# Patient Record
Sex: MALE | Race: WHITE | NOT HISPANIC OR LATINO | URBAN - METROPOLITAN AREA
[De-identification: names, ages, dates, MRNs, and addresses within clinical notes are randomized per-mention and may not be internally consistent; named-entity substitution may affect disease eponyms.]

---

## 2023-05-23 ENCOUNTER — INPATIENT (INPATIENT)
Facility: HOSPITAL | Age: 68
LOS: 1 days | Discharge: HOME | End: 2023-05-25
Attending: INTERNAL MEDICINE | Admitting: INTERNAL MEDICINE
Payer: COMMERCIAL

## 2023-05-23 VITALS
SYSTOLIC BLOOD PRESSURE: 156 MMHG | RESPIRATION RATE: 16 BRPM | HEART RATE: 68 BPM | DIASTOLIC BLOOD PRESSURE: 91 MMHG | OXYGEN SATURATION: 99 % | TEMPERATURE: 98 F

## 2023-05-23 LAB
ALBUMIN SERPL ELPH-MCNC: 4.6 G/DL — SIGNIFICANT CHANGE UP (ref 3.3–5)
ALP SERPL-CCNC: 48 U/L — SIGNIFICANT CHANGE UP (ref 40–120)
ALT FLD-CCNC: 13 U/L — SIGNIFICANT CHANGE UP (ref 4–41)
ANION GAP SERPL CALC-SCNC: 12 MMOL/L — SIGNIFICANT CHANGE UP (ref 7–14)
AST SERPL-CCNC: 22 U/L — SIGNIFICANT CHANGE UP (ref 4–40)
BASE EXCESS BLDV CALC-SCNC: 0.7 MMOL/L — SIGNIFICANT CHANGE UP (ref -2–3)
BASOPHILS # BLD AUTO: 0.04 K/UL — SIGNIFICANT CHANGE UP (ref 0–0.2)
BASOPHILS NFR BLD AUTO: 0.5 % — SIGNIFICANT CHANGE UP (ref 0–2)
BILIRUB SERPL-MCNC: 0.3 MG/DL — SIGNIFICANT CHANGE UP (ref 0.2–1.2)
BLOOD GAS VENOUS COMPREHENSIVE RESULT: SIGNIFICANT CHANGE UP
BUN SERPL-MCNC: 14 MG/DL — SIGNIFICANT CHANGE UP (ref 7–23)
CALCIUM SERPL-MCNC: 9.3 MG/DL — SIGNIFICANT CHANGE UP (ref 8.4–10.5)
CHLORIDE BLDV-SCNC: 105 MMOL/L — SIGNIFICANT CHANGE UP (ref 96–108)
CHLORIDE SERPL-SCNC: 105 MMOL/L — SIGNIFICANT CHANGE UP (ref 98–107)
CO2 BLDV-SCNC: 29.1 MMOL/L — HIGH (ref 22–26)
CO2 SERPL-SCNC: 24 MMOL/L — SIGNIFICANT CHANGE UP (ref 22–31)
CREAT SERPL-MCNC: 0.97 MG/DL — SIGNIFICANT CHANGE UP (ref 0.5–1.3)
EGFR: 85 ML/MIN/1.73M2 — SIGNIFICANT CHANGE UP
EOSINOPHIL # BLD AUTO: 0 K/UL — SIGNIFICANT CHANGE UP (ref 0–0.5)
EOSINOPHIL NFR BLD AUTO: 0 % — SIGNIFICANT CHANGE UP (ref 0–6)
GAS PNL BLDV: 138 MMOL/L — SIGNIFICANT CHANGE UP (ref 136–145)
GLUCOSE BLDV-MCNC: 124 MG/DL — HIGH (ref 70–99)
GLUCOSE SERPL-MCNC: 118 MG/DL — HIGH (ref 70–99)
HCO3 BLDV-SCNC: 28 MMOL/L — SIGNIFICANT CHANGE UP (ref 22–29)
HCT VFR BLD CALC: 46.8 % — SIGNIFICANT CHANGE UP (ref 39–50)
HCT VFR BLDA CALC: 47 % — SIGNIFICANT CHANGE UP (ref 39–51)
HGB BLD CALC-MCNC: 15.5 G/DL — SIGNIFICANT CHANGE UP (ref 12.6–17.4)
HGB BLD-MCNC: 14.7 G/DL — SIGNIFICANT CHANGE UP (ref 13–17)
IANC: 6.11 K/UL — SIGNIFICANT CHANGE UP (ref 1.8–7.4)
IMM GRANULOCYTES NFR BLD AUTO: 0.4 % — SIGNIFICANT CHANGE UP (ref 0–0.9)
LACTATE BLDV-MCNC: 1.7 MMOL/L — SIGNIFICANT CHANGE UP (ref 0.5–2)
LIDOCAIN IGE QN: 28 U/L — SIGNIFICANT CHANGE UP (ref 7–60)
LYMPHOCYTES # BLD AUTO: 1.17 K/UL — SIGNIFICANT CHANGE UP (ref 1–3.3)
LYMPHOCYTES # BLD AUTO: 15.1 % — SIGNIFICANT CHANGE UP (ref 13–44)
MCHC RBC-ENTMCNC: 28.2 PG — SIGNIFICANT CHANGE UP (ref 27–34)
MCHC RBC-ENTMCNC: 31.4 GM/DL — LOW (ref 32–36)
MCV RBC AUTO: 89.7 FL — SIGNIFICANT CHANGE UP (ref 80–100)
MONOCYTES # BLD AUTO: 0.38 K/UL — SIGNIFICANT CHANGE UP (ref 0–0.9)
MONOCYTES NFR BLD AUTO: 4.9 % — SIGNIFICANT CHANGE UP (ref 2–14)
NEUTROPHILS # BLD AUTO: 6.11 K/UL — SIGNIFICANT CHANGE UP (ref 1.8–7.4)
NEUTROPHILS NFR BLD AUTO: 79.1 % — HIGH (ref 43–77)
NRBC # BLD: 0 /100 WBCS — SIGNIFICANT CHANGE UP (ref 0–0)
NRBC # FLD: 0 K/UL — SIGNIFICANT CHANGE UP (ref 0–0)
NT-PROBNP SERPL-SCNC: 113 PG/ML — SIGNIFICANT CHANGE UP
PCO2 BLDV: 51 MMHG — SIGNIFICANT CHANGE UP (ref 42–55)
PH BLDV: 7.34 — SIGNIFICANT CHANGE UP (ref 7.32–7.43)
PLATELET # BLD AUTO: 220 K/UL — SIGNIFICANT CHANGE UP (ref 150–400)
PO2 BLDV: 37 MMHG — SIGNIFICANT CHANGE UP (ref 25–45)
POTASSIUM BLDV-SCNC: 4.4 MMOL/L — SIGNIFICANT CHANGE UP (ref 3.5–5.1)
POTASSIUM SERPL-MCNC: 4.1 MMOL/L — SIGNIFICANT CHANGE UP (ref 3.5–5.3)
POTASSIUM SERPL-SCNC: 4.1 MMOL/L — SIGNIFICANT CHANGE UP (ref 3.5–5.3)
PROT SERPL-MCNC: 7.3 G/DL — SIGNIFICANT CHANGE UP (ref 6–8.3)
RBC # BLD: 5.22 M/UL — SIGNIFICANT CHANGE UP (ref 4.2–5.8)
RBC # FLD: 13.8 % — SIGNIFICANT CHANGE UP (ref 10.3–14.5)
SAO2 % BLDV: 59.1 % — LOW (ref 67–88)
SODIUM SERPL-SCNC: 141 MMOL/L — SIGNIFICANT CHANGE UP (ref 135–145)
TROPONIN T, HIGH SENSITIVITY RESULT: 87 NG/L — CRITICAL HIGH
WBC # BLD: 7.73 K/UL — SIGNIFICANT CHANGE UP (ref 3.8–10.5)
WBC # FLD AUTO: 7.73 K/UL — SIGNIFICANT CHANGE UP (ref 3.8–10.5)

## 2023-05-23 PROCEDURE — 99285 EMERGENCY DEPT VISIT HI MDM: CPT

## 2023-05-23 RX ORDER — HEPARIN SODIUM 5000 [USP'U]/ML
6000 INJECTION INTRAVENOUS; SUBCUTANEOUS EVERY 6 HOURS
Refills: 0 | Status: DISCONTINUED | OUTPATIENT
Start: 2023-05-23 | End: 2023-05-24

## 2023-05-23 RX ORDER — HEPARIN SODIUM 5000 [USP'U]/ML
5000 INJECTION INTRAVENOUS; SUBCUTANEOUS ONCE
Refills: 0 | Status: COMPLETED | OUTPATIENT
Start: 2023-05-23 | End: 2023-05-24

## 2023-05-23 RX ORDER — ASPIRIN/CALCIUM CARB/MAGNESIUM 324 MG
162 TABLET ORAL ONCE
Refills: 0 | Status: COMPLETED | OUTPATIENT
Start: 2023-05-23 | End: 2023-05-23

## 2023-05-23 RX ORDER — HEPARIN SODIUM 5000 [USP'U]/ML
INJECTION INTRAVENOUS; SUBCUTANEOUS
Qty: 25000 | Refills: 0 | Status: DISCONTINUED | OUTPATIENT
Start: 2023-05-23 | End: 2023-05-24

## 2023-05-23 RX ADMIN — Medication 162 MILLIGRAM(S): at 21:58

## 2023-05-23 NOTE — ED ADULT NURSE NOTE - NSFALLUNIVINTERV_ED_ALL_ED
Bed/Stretcher in lowest position, wheels locked, appropriate side rails in place/Call bell, personal items and telephone in reach/Instruct patient to call for assistance before getting out of bed/chair/stretcher/Non-slip footwear applied when patient is off stretcher/[employer] to call system/Physically safe environment - no spills, clutter or unnecessary equipment/Purposeful proactive rounding/Room/bathroom lighting operational, light cord in reach

## 2023-05-23 NOTE — ED ADULT TRIAGE NOTE - CHIEF COMPLAINT QUOTE
Pt c/o intermittent chest pain, radiating across chest accompanied with chills, breathing even and unlabored, denies headache/dizziness, no abd pain, afebrile.

## 2023-05-23 NOTE — ED ADULT NURSE NOTE - OBJECTIVE STATEMENT
Patient received in ED intake room 5. A&Ox4 and ambulatory. C/o midsternal chest pain, SOB and fevers since around 1700 today. Pt states also endorsed 1 episode of vomiting, no active vomiting at this time. Pt states chest pain is intermittent and currently denies at this time. Placed on bedside cardiac monitor - NSR. 100% O2 saturation on room air. Respirations even and unlabored. No acute distress noted. Speaking in full and complete sentences. Appears comfortably laying in stretcher, HOB elevated. Aspirin administered as ordered. IV 20g placed to right AC, labs drawn and sent as ordered. Bed in lowest position, call bell in reach, wheels locked, appropriate side rails up, safety maintained. Awaiting xray.

## 2023-05-23 NOTE — ED PROVIDER NOTE - PROGRESS NOTE DETAILS
received pt as a sign out with concern for ACS. EKG - no stemi. Noted to have elevated trop. WIll start the patient on heparin. D/w hospitalist - accepted admission. Cards is paged, awaiting call back.

## 2023-05-23 NOTE — ED PROVIDER NOTE - CLINICAL SUMMARY MEDICAL DECISION MAKING FREE TEXT BOX
68 yo M hx HLD, presenting with complaints of chest pain that started at 5 pm upon waking up with associated nausea and sob. Took an aspirin at that time which helped and he tried eating a hamburger thinking he was hungry but ended up having episode of vomiting. Symptoms have since subsided. Denies radiating pain, numbness/tingling. No fevers/chills or cough. Last seen by doctor a few years ago. Hypertensive today but denies known hx HTN. On exam, well appearing, NAD, heart rrr, lungs ctab, abd soft nt/nd. EKG without stemi, HEART score moderate, plan for CDU vs admission for cardiac work up.

## 2023-05-24 DIAGNOSIS — Z79.899 OTHER LONG TERM (CURRENT) DRUG THERAPY: ICD-10-CM

## 2023-05-24 DIAGNOSIS — R07.9 CHEST PAIN, UNSPECIFIED: ICD-10-CM

## 2023-05-24 DIAGNOSIS — Z29.9 ENCOUNTER FOR PROPHYLACTIC MEASURES, UNSPECIFIED: ICD-10-CM

## 2023-05-24 DIAGNOSIS — I21.4 NON-ST ELEVATION (NSTEMI) MYOCARDIAL INFARCTION: ICD-10-CM

## 2023-05-24 LAB
A1C WITH ESTIMATED AVERAGE GLUCOSE RESULT: 5.6 % — SIGNIFICANT CHANGE UP (ref 4–5.6)
APTT BLD: 30.4 SEC — SIGNIFICANT CHANGE UP (ref 27–36.3)
APTT BLD: 40 SEC — HIGH (ref 27–36.3)
CHOLEST SERPL-MCNC: 272 MG/DL — HIGH
CK MB BLD-MCNC: 12.9 % — HIGH (ref 0–2.5)
CK MB CFR SERPL CALC: 51.7 NG/ML — HIGH
CK SERPL-CCNC: 402 U/L — HIGH (ref 30–200)
ESTIMATED AVERAGE GLUCOSE: 114 — SIGNIFICANT CHANGE UP
HCT VFR BLD CALC: 46.5 % — SIGNIFICANT CHANGE UP (ref 39–50)
HDLC SERPL-MCNC: 65 MG/DL — SIGNIFICANT CHANGE UP
HGB BLD-MCNC: 15 G/DL — SIGNIFICANT CHANGE UP (ref 13–17)
INR BLD: 1.09 RATIO — SIGNIFICANT CHANGE UP (ref 0.88–1.16)
LIPID PNL WITH DIRECT LDL SERPL: 191 MG/DL — HIGH
MCHC RBC-ENTMCNC: 28.8 PG — SIGNIFICANT CHANGE UP (ref 27–34)
MCHC RBC-ENTMCNC: 32.3 GM/DL — SIGNIFICANT CHANGE UP (ref 32–36)
MCV RBC AUTO: 89.4 FL — SIGNIFICANT CHANGE UP (ref 80–100)
NON HDL CHOLESTEROL: 207 MG/DL — HIGH
NRBC # BLD: 0 /100 WBCS — SIGNIFICANT CHANGE UP (ref 0–0)
NRBC # FLD: 0 K/UL — SIGNIFICANT CHANGE UP (ref 0–0)
PLATELET # BLD AUTO: 223 K/UL — SIGNIFICANT CHANGE UP (ref 150–400)
PROTHROM AB SERPL-ACNC: 12.7 SEC — SIGNIFICANT CHANGE UP (ref 10.5–13.4)
RBC # BLD: 5.2 M/UL — SIGNIFICANT CHANGE UP (ref 4.2–5.8)
RBC # FLD: 14 % — SIGNIFICANT CHANGE UP (ref 10.3–14.5)
TRIGL SERPL-MCNC: 82 MG/DL — SIGNIFICANT CHANGE UP
TROPONIN T, HIGH SENSITIVITY RESULT: 252 NG/L — CRITICAL HIGH
TROPONIN T, HIGH SENSITIVITY RESULT: 374 NG/L — CRITICAL HIGH
WBC # BLD: 8.01 K/UL — SIGNIFICANT CHANGE UP (ref 3.8–10.5)
WBC # FLD AUTO: 8.01 K/UL — SIGNIFICANT CHANGE UP (ref 3.8–10.5)

## 2023-05-24 PROCEDURE — 93010 ELECTROCARDIOGRAM REPORT: CPT

## 2023-05-24 PROCEDURE — 93458 L HRT ARTERY/VENTRICLE ANGIO: CPT | Mod: 26,59

## 2023-05-24 PROCEDURE — 99223 1ST HOSP IP/OBS HIGH 75: CPT

## 2023-05-24 PROCEDURE — 71046 X-RAY EXAM CHEST 2 VIEWS: CPT | Mod: 26

## 2023-05-24 PROCEDURE — 99152 MOD SED SAME PHYS/QHP 5/>YRS: CPT

## 2023-05-24 PROCEDURE — 92941 PRQ TRLML REVSC TOT OCCL AMI: CPT | Mod: LD

## 2023-05-24 RX ORDER — ATORVASTATIN CALCIUM 80 MG/1
80 TABLET, FILM COATED ORAL ONCE
Refills: 0 | Status: DISCONTINUED | OUTPATIENT
Start: 2023-05-24 | End: 2023-05-25

## 2023-05-24 RX ORDER — TICAGRELOR 90 MG/1
1 TABLET ORAL
Qty: 60 | Refills: 0
Start: 2023-05-24 | End: 2023-06-22

## 2023-05-24 RX ORDER — ACETAMINOPHEN 500 MG
650 TABLET ORAL ONCE
Refills: 0 | Status: COMPLETED | OUTPATIENT
Start: 2023-05-24 | End: 2023-05-24

## 2023-05-24 RX ORDER — CARVEDILOL PHOSPHATE 80 MG/1
3.12 CAPSULE, EXTENDED RELEASE ORAL EVERY 12 HOURS
Refills: 0 | Status: DISCONTINUED | OUTPATIENT
Start: 2023-05-24 | End: 2023-05-25

## 2023-05-24 RX ORDER — ASPIRIN/CALCIUM CARB/MAGNESIUM 324 MG
81 TABLET ORAL DAILY
Refills: 0 | Status: DISCONTINUED | OUTPATIENT
Start: 2023-05-25 | End: 2023-05-25

## 2023-05-24 RX ORDER — SODIUM CHLORIDE 9 MG/ML
1000 INJECTION INTRAMUSCULAR; INTRAVENOUS; SUBCUTANEOUS
Refills: 0 | Status: DISCONTINUED | OUTPATIENT
Start: 2023-05-24 | End: 2023-05-25

## 2023-05-24 RX ORDER — TICAGRELOR 90 MG/1
1 TABLET ORAL
Qty: 60 | Refills: 6
Start: 2023-05-24 | End: 2023-12-19

## 2023-05-24 RX ORDER — TICAGRELOR 90 MG/1
90 TABLET ORAL EVERY 12 HOURS
Refills: 0 | Status: DISCONTINUED | OUTPATIENT
Start: 2023-05-24 | End: 2023-05-25

## 2023-05-24 RX ORDER — TICAGRELOR 90 MG/1
180 TABLET ORAL ONCE
Refills: 0 | Status: COMPLETED | OUTPATIENT
Start: 2023-05-24 | End: 2023-05-24

## 2023-05-24 RX ORDER — ATORVASTATIN CALCIUM 80 MG/1
80 TABLET, FILM COATED ORAL AT BEDTIME
Refills: 0 | Status: DISCONTINUED | OUTPATIENT
Start: 2023-05-24 | End: 2023-05-25

## 2023-05-24 RX ADMIN — HEPARIN SODIUM 5000 UNIT(S): 5000 INJECTION INTRAVENOUS; SUBCUTANEOUS at 00:17

## 2023-05-24 RX ADMIN — Medication 650 MILLIGRAM(S): at 19:40

## 2023-05-24 RX ADMIN — Medication 650 MILLIGRAM(S): at 20:40

## 2023-05-24 RX ADMIN — TICAGRELOR 180 MILLIGRAM(S): 90 TABLET ORAL at 01:31

## 2023-05-24 RX ADMIN — HEPARIN SODIUM 1200 UNIT(S)/HR: 5000 INJECTION INTRAVENOUS; SUBCUTANEOUS at 08:30

## 2023-05-24 RX ADMIN — HEPARIN SODIUM 1000 UNIT(S)/HR: 5000 INJECTION INTRAVENOUS; SUBCUTANEOUS at 00:18

## 2023-05-24 RX ADMIN — SODIUM CHLORIDE 200 MILLILITER(S): 9 INJECTION INTRAMUSCULAR; INTRAVENOUS; SUBCUTANEOUS at 12:45

## 2023-05-24 RX ADMIN — TICAGRELOR 90 MILLIGRAM(S): 90 TABLET ORAL at 14:02

## 2023-05-24 RX ADMIN — HEPARIN SODIUM 1200 UNIT(S)/HR: 5000 INJECTION INTRAVENOUS; SUBCUTANEOUS at 07:20

## 2023-05-24 RX ADMIN — CARVEDILOL PHOSPHATE 3.12 MILLIGRAM(S): 80 CAPSULE, EXTENDED RELEASE ORAL at 19:40

## 2023-05-24 RX ADMIN — ATORVASTATIN CALCIUM 80 MILLIGRAM(S): 80 TABLET, FILM COATED ORAL at 21:26

## 2023-05-24 NOTE — PROVIDER CONTACT NOTE (MEDICATION) - RECOMMENDATIONS
Brilinta will cost patient $30 a month as per [facility] staff. They can give a 1 month free trial and then can give a coupon for 5 dollars thereafter. Patient will  prescription from [facility] upon discharge and then 5 dollar coupon given to patient and 6 month supply sent to patient's primary pharmacy

## 2023-05-24 NOTE — PATIENT PROFILE ADULT - FALL HARM RISK - HARM RISK INTERVENTIONS
Communicate Risk of Fall with Harm to all staff/Orthostatic vital signs/Reinforce activity limits and safety measures with patient and family/Tailored Fall Risk Interventions/Visual Cue: Yellow wristband and red socks/Bed in lowest position, wheels locked, appropriate side rails in place/Call bell, personal items and telephone in reach/Instruct patient to call for assistance before getting out of bed or chair/Non-slip footwear when patient is out of bed/[employer] to call system/Physically safe environment - no spills, clutter or unnecessary equipment/Purposeful Proactive Rounding/Room/bathroom lighting operational, light cord in reach

## 2023-05-24 NOTE — H&P ADULT - PROBLEM SELECTOR PLAN 1
-although  jodie NSTEM score not markedly elevated, presentation with recent h/o reduced exercise tolerance and trop 87, renders it reasonable to initiate dapt/ heparin gtt  -tele, tte, npo pending cards eval  -lipid panel, a1c ordered

## 2023-05-24 NOTE — CONSULT NOTE ADULT - ASSESSMENT
66 yo M hx HLD, presenting with complaints of chest pain that started at 5 pm upon waking up with associated nausea and sob.     # NSTEMI  - Patient with elevated CE and 6/10 chest pain. Chest pain free at present.   - Admit to telemetry  - Load with  mg PO x 1 now, Brilinta 180 mg PO x 1 and start heparin gtt as per ACS protocol  - Assess for resolution of chest pain and recurrence in chest pain. Serial EKG PRN chest pain to assess for ST changes  - Continuous cardiac monitoring to monitor for arrhythmias  - CBC, CMP, coags, HbA1C, TSH, lipids for comorbidities, Trend cardiac enzymes  - Aspirin 81 PO daily, Brilinta 90 mg PO BID, Lipitor 80 mg PO daily  - If persistent chest pain with EKG changes, will plan for emergent cath   - Hold ACE for now in anticipation of possible cath.   - Low fat DASH diet  - ECHO: TTE in am      Thank you, if any questions or clinical situation changes please call [facility] #[phone].  Attending Attestation to follow     68 yo M hx HLD, presenting with complaints of chest pain that started at 5 pm upon waking up with associated nausea and sob.     # NSTEMI  - Patient with elevated CE and 6/10 chest pain. Chest pain free at present.   - Admit to telemetry  - Load with  mg PO x 1 now, Brilinta 180 mg PO x 1 and start heparin gtt as per ACS protocol  - Assess for resolution of chest pain and recurrence in chest pain. Serial EKG PRN chest pain to assess for ST changes  - Continuous cardiac monitoring to monitor for arrhythmias  - CBC, CMP, coags, HbA1C, TSH, lipids for comorbidities, Trend cardiac enzymes  - Aspirin 81 PO daily, Brilinta 90 mg PO BID, Lipitor 80 mg PO daily  - If persistent chest pain with EKG changes, will plan for emergent cath   - Hold ACE for now in anticipation of possible cath.   - Low fat DASH diet  - ECHO: TTE in am      Thank you, if any questions or clinical situation changes please call [facility] #[phone].  Attending Attestation to follow    This patient was seen and examined personally by me and the plan was discussed with the fellow and/or resident above. Amendments were made as necessary to the above. Agree with the excellent note and plan above. 67M w HL here w CP. Trop and TTE pending; ACS tx.    Jr Squires MD, MPhil, [facility]  Cardiologist, [facility]  ; [employer] and [facility] [place]  Email: [email or web address] Cardiology and Cardiovascular Surgery on-service contact/call information, go to [email or web address] and use "[email or web address]" to login.  Outpatient Cardiology appointments, call [phone] to arrange with a colleague; I do not have outpatient Cardiology clinic.

## 2023-05-24 NOTE — H&P ADULT - HISTORY OF PRESENT ILLNESS
66 yo m with h/o HLD on statin but reports suboptimal compliance  presenting with left sided chest pain radiating across his chest which spontaneously resolved within 2 hours. Denies cough, fever, leg swelling. Reports decreased exercise tolerance in last 2 weeks. Denies prior cath but underwent negative stress test 2 years ago. EKG nonischemic Trop 87

## 2023-05-24 NOTE — H&P ADULT - NSHPLABSRESULTS_GEN_ALL_CORE
14.7   7.73  )-----------( 220      ( 23 May 2023 22:00 )             46.8     05-23    141  |  105  |  14  ----------------------------<  118<H>  4.1   |  24  |  0.97    Ca    9.3      23 May 2023 22:00    TPro  7.3  /  Alb  4.6  /  TBili  0.3  /  DBili  x   /  AST  22  /  ALT  13  /  AlkPhos  48  05-23    CAPILLARY BLOOD GLUCOSE        PT/INR - ( 23 May 2023 23:43 )   PT: 12.7 sec;   INR: 1.09 ratio         PTT - ( 23 May 2023 23:43 )  PTT:30.4 sec        Vital Signs Last 24 Hrs  T(C): 36.4 (24 May 2023 00:52), Max: 37 (23 May 2023 23:46)  T(F): 97.6 (24 May 2023 00:52), Max: 98.6 (23 May 2023 23:46)  HR: 64 (24 May 2023 00:52) (64 - 68)  BP: 122/77 (24 May 2023 00:52) (122/77 - 156/91)  BP(mean): --  RR: 16 (24 May 2023 00:52) (16 - 17)  SpO2: 99% (24 May 2023 00:52) (99% - 99%)    Parameters below as of 24 May 2023 00:52  Patient On (Oxygen Delivery Method): room air

## 2023-05-24 NOTE — CHART NOTE - NSCHARTNOTEFT_GEN_A_CORE
Right radial site s/p LHC procedure is without bleeding or hematoma. Dressing dry, clean and intact. Vital signs stable. BL radial and brachial pulses palpable and symmetric. Sensation, strength, and ROM equal bilaterally. Patient denies chest pain, SOB, numbness, and tingling. Will continue to monitor.
NSTEMI patient with significant LAD disease s/p PCI with GRAHAM.     Please start Coreg 3.125mg BID  - Continue ASA, Brillinta and lipitor      Mariano Marrero MD  Cardiology Fellow- PGY 4

## 2023-05-24 NOTE — CONSULT NOTE ADULT - SUBJECTIVE AND OBJECTIVE BOX
HPI:  68 yo m with h/o HLD on statin but reports suboptimal compliance  presenting with left sided chest pain radiating across his chest which spontaneously resolved within 2 hours. Denies cough, fever, leg swelling. Reports decreased exercise tolerance in last 2 weeks. Denies prior cath but underwent negative stress test 2 years ago. EKG nonischemic Trop 87 (24 May 2023 01:20)  	     Allergies    No Known Allergies    Intolerances    	  MEDICATIONS:  heparin   Injectable 6000 Unit(s) IV Push every 6 hours PRN  heparin  Infusion.  Unit(s)/Hr IV Continuous <Continuous>  atorvastatin 80 milliGRAM(s) Oral once  atorvastatin 80 milliGRAM(s) Oral at bedtime        PAST MEDICAL & SURGICAL HISTORY:  HLD (hyperlipidemia)          FAMILY HISTORY:      SUBSTANCE USE  Tobacco Usage:  denies  Alcohol Usage: denies  Recreational drugs: denies      REVIEW OF SYSTEMS:  CV: chest pain (-), palpitation (-), orthopnea (-), PND (-), edema (-)  PULM: SOB (-), cough (-), wheezing (-), hemoptysis (-).   CONST: fever (-), chills (-) or fatigue (-)  GI: abdominal distension (-), abdominal pain (-) , nausea/vomiting (-), hematemesis, (-), melena (-), hematochezia (-)  : dysuria (-), frequency (-), hematuria (-).   NEURO: numbness (-), weakness (-), dizziness (-)  SKIN: itching (-), rash (-)  HEENT:  visual changes (-); vertigo or throat pain (-);  neck stiffness (-)   All other review of systems is negative unless indicated above.      T(C): 36.6 (05-24-23 @ 07:22), Max: 37 (05-23-23 @ 23:46)  HR: 75 (05-24-23 @ 07:22) (64 - 75)  BP: 136/68 (05-24-23 @ 07:22) (122/77 - 156/91)  RR: 18 (05-24-23 @ 07:22) (16 - 18)  SpO2: 100% (05-24-23 @ 07:22) (99% - 100%)  Wt(kg): --  I&O's Summary      Physical Exam:  General: NAD  Cardiovascular: Normal S1 S2, No JVD, No murmurs, No edema  Respiratory: Lungs clear to auscultation	  Gastrointestinal:  Soft, Non-tender, + BS	  Skin: warm and dry, No rashes, No ecchymoses, No cyanosis	  Extremities:  No clubbing, cyanosis or edema  Vascular: Peripheral pulses palpable 2+ bilaterally    CBC Full  -  ( 24 May 2023 05:45 )  WBC Count : 8.01 K/uL  Hemoglobin : 15.0 g/dL  Hematocrit : 46.5 %  Platelet Count - Automated : 223 K/uL  Mean Cell Volume : 89.4 fL  Mean Cell Hemoglobin : 28.8 pg  Mean Cell Hemoglobin Concentration : 32.3 gm/dL  Auto Neutrophil # : x  Auto Lymphocyte # : x  Auto Monocyte # : x  Auto Eosinophil # : x  Auto Basophil # : x  Auto Neutrophil % : x  Auto Lymphocyte % : x  Auto Monocyte % : x  Auto Eosinophil % : x  Auto Basophil % : x    05-23    141  |  105  |  14  ----------------------------<  118<H>  4.1   |  24  |  0.97    Ca    9.3      23 May 2023 22:00    TPro  7.3  /  Alb  4.6  /  TBili  0.3  /  DBili  x   /  AST  22  /  ALT  13  /  AlkPhos  48  05-23    proBNP: 113  CARDIAC MARKERS ( 24 May 2023 05:45 )  374 ng/L / x     / x     / x     / x     / x      CARDIAC MARKERS ( 24 May 2023 02:15 )  252 ng/L / x     / x     / 402 U/L / x     / 51.7 ng/mL  CARDIAC MARKERS ( 23 May 2023 22:00 )  87 ng/L / x     / x     / x     / x     / x              Diagnostic testing:  cath: --  echo: --

## 2023-05-24 NOTE — ED ADULT NURSE REASSESSMENT NOTE - NS ED NURSE REASSESS COMMENT FT1
Heparin infusion started as ordered, running at 10 mL/hr. Pt made aware to let staff know if experiences adverse effects or bleeding. Cosigned with ELIZA [employer]. Pt offers no complaints at this time. Appears comfortable in stretcher. HOB elevated. NSR on bedside cardiac monitor. Respirations even and unlabored. Awaiting bed assignment as per admission orders.
pt a&ox4. 2nd access obtained 20g to the . labs collected and sent. pt weighed. md made aware, pending repeat troponin at 01:00AM. pt normal sinus on monitor. pt respirations even and unlabored with no accessory muscle use.
[facility].

## 2023-05-24 NOTE — H&P ADULT - NSHPREVIEWOFSYSTEMS_GEN_ALL_CORE
Review of Systems:   CONSTITUTIONAL: No fever  EYES: No eye pain, visual disturbances, or discharge  ENMT:  No difficulty hearing, tinnitus, vertigo; No sinus or throat pain  NECK: No pain or stiffness  RESPIRATORY: No cough, wheezing, chills or hemoptysis; No shortness of breath  CARDIOVASCULAR: resolved chest pain, no palpitations, dizziness, or leg swelling  GASTROINTESTINAL: No abdominal or epigastric pain. No nausea, vomiting, or hematemesis; No diarrhea or constipation. No melena or hematochezia.  GENITOURINARY: No dysuria, frequency, hematuria, or incontinence  NEUROLOGICAL: No headaches, memory loss, loss of strength, numbness, or tremors  SKIN: No itching, burning, rashes, or lesions   LYMPH NODES: No enlarged glands  ENDOCRINE: No heat or cold intolerance; No hair loss  MUSCULOSKELETAL: No joint pain or swelling; No muscle, back, or extremity pain

## 2023-05-24 NOTE — PROVIDER CONTACT NOTE (MEDICATION) - BACKGROUND
Patient s/p C with stents placed x 2 to the prox and mid LAD and Brilinta script sent to [facility] for copay check.

## 2023-05-25 VITALS
RESPIRATION RATE: 18 BRPM | SYSTOLIC BLOOD PRESSURE: 139 MMHG | DIASTOLIC BLOOD PRESSURE: 86 MMHG | OXYGEN SATURATION: 99 % | TEMPERATURE: 98 F | HEART RATE: 69 BPM

## 2023-05-25 LAB
ANION GAP SERPL CALC-SCNC: 10 MMOL/L — SIGNIFICANT CHANGE UP (ref 7–14)
BUN SERPL-MCNC: 12 MG/DL — SIGNIFICANT CHANGE UP (ref 7–23)
CALCIUM SERPL-MCNC: 8.6 MG/DL — SIGNIFICANT CHANGE UP (ref 8.4–10.5)
CHLORIDE SERPL-SCNC: 106 MMOL/L — SIGNIFICANT CHANGE UP (ref 98–107)
CO2 SERPL-SCNC: 22 MMOL/L — SIGNIFICANT CHANGE UP (ref 22–31)
CREAT SERPL-MCNC: 0.91 MG/DL — SIGNIFICANT CHANGE UP (ref 0.5–1.3)
EGFR: 92 ML/MIN/1.73M2 — SIGNIFICANT CHANGE UP
GLUCOSE SERPL-MCNC: 107 MG/DL — HIGH (ref 70–99)
HCT VFR BLD CALC: 43.1 % — SIGNIFICANT CHANGE UP (ref 39–50)
HCV AB S/CO SERPL IA: 0.08 S/CO — SIGNIFICANT CHANGE UP (ref 0–0.99)
HCV AB SERPL-IMP: SIGNIFICANT CHANGE UP
HGB BLD-MCNC: 14.2 G/DL — SIGNIFICANT CHANGE UP (ref 13–17)
MAGNESIUM SERPL-MCNC: 2.2 MG/DL — SIGNIFICANT CHANGE UP (ref 1.6–2.6)
MCHC RBC-ENTMCNC: 29.6 PG — SIGNIFICANT CHANGE UP (ref 27–34)
MCHC RBC-ENTMCNC: 32.9 GM/DL — SIGNIFICANT CHANGE UP (ref 32–36)
MCV RBC AUTO: 89.8 FL — SIGNIFICANT CHANGE UP (ref 80–100)
NRBC # BLD: 0 /100 WBCS — SIGNIFICANT CHANGE UP (ref 0–0)
NRBC # FLD: 0 K/UL — SIGNIFICANT CHANGE UP (ref 0–0)
PHOSPHATE SERPL-MCNC: 2.4 MG/DL — LOW (ref 2.5–4.5)
PLATELET # BLD AUTO: 199 K/UL — SIGNIFICANT CHANGE UP (ref 150–400)
POTASSIUM SERPL-MCNC: 3.6 MMOL/L — SIGNIFICANT CHANGE UP (ref 3.5–5.3)
POTASSIUM SERPL-SCNC: 3.6 MMOL/L — SIGNIFICANT CHANGE UP (ref 3.5–5.3)
RBC # BLD: 4.8 M/UL — SIGNIFICANT CHANGE UP (ref 4.2–5.8)
RBC # FLD: 14.1 % — SIGNIFICANT CHANGE UP (ref 10.3–14.5)
SODIUM SERPL-SCNC: 138 MMOL/L — SIGNIFICANT CHANGE UP (ref 135–145)
WBC # BLD: 7.17 K/UL — SIGNIFICANT CHANGE UP (ref 3.8–10.5)
WBC # FLD AUTO: 7.17 K/UL — SIGNIFICANT CHANGE UP (ref 3.8–10.5)

## 2023-05-25 PROCEDURE — 93306 TTE W/DOPPLER COMPLETE: CPT | Mod: 26

## 2023-05-25 PROCEDURE — 99233 SBSQ HOSP IP/OBS HIGH 50: CPT

## 2023-05-25 RX ORDER — ASPIRIN/CALCIUM CARB/MAGNESIUM 324 MG
1 TABLET ORAL
Qty: 0 | Refills: 0 | DISCHARGE
Start: 2023-05-25

## 2023-05-25 RX ORDER — ATORVASTATIN CALCIUM 80 MG/1
1 TABLET, FILM COATED ORAL
Refills: 0 | DISCHARGE

## 2023-05-25 RX ORDER — CARVEDILOL PHOSPHATE 80 MG/1
1 CAPSULE, EXTENDED RELEASE ORAL
Qty: 60 | Refills: 0
Start: 2023-05-25 | End: 2023-06-23

## 2023-05-25 RX ORDER — ATORVASTATIN CALCIUM 80 MG/1
1 TABLET, FILM COATED ORAL
Qty: 30 | Refills: 0
Start: 2023-05-25 | End: 2023-06-23

## 2023-05-25 RX ADMIN — Medication 81 MILLIGRAM(S): at 14:47

## 2023-05-25 RX ADMIN — TICAGRELOR 90 MILLIGRAM(S): 90 TABLET ORAL at 14:48

## 2023-05-25 RX ADMIN — TICAGRELOR 90 MILLIGRAM(S): 90 TABLET ORAL at 02:10

## 2023-05-25 RX ADMIN — CARVEDILOL PHOSPHATE 3.12 MILLIGRAM(S): 80 CAPSULE, EXTENDED RELEASE ORAL at 05:22

## 2023-05-25 NOTE — DISCHARGE NOTE PROVIDER - NSDCMRMEDTOKEN_GEN_ALL_CORE_FT
aspirin 81 mg oral delayed release tablet: 1 tab(s) orally once a day  atorvastatin 80 mg oral tablet: 1 tab(s) orally once a day (at bedtime)  Brilinta (ticagrelor) 90 mg oral tablet: 1 tab(s) orally every 12 hours  carvedilol 3.125 mg oral tablet: 1 tab(s) orally every 12 hours

## 2023-05-25 NOTE — PROGRESS NOTE ADULT - SUBJECTIVE AND OBJECTIVE BOX
pt was evaluated by hospitalist earlier during the day   cards f/u note reviewed  discussed management plan with acp covering pt
JEWELS MCGRAW  PGY-1  Cardiology Consult Service  All Cardiology service information can be found 24/7 on [email or web address], password: [email or web address]    Patient seen and examined at bedside. Comfortable, no acute complaints.     Overnight Events: NAEO.     Review Of Systems: No chest pain, shortness of breath, or palpitations            Current Meds:  aspirin enteric coated 81 milliGRAM(s) Oral daily  atorvastatin 80 milliGRAM(s) Oral once  atorvastatin 80 milliGRAM(s) Oral at bedtime  carvedilol 3.125 milliGRAM(s) Oral every 12 hours  sodium chloride 0.9%. 1000 milliLiter(s) IV Continuous <Continuous>  ticagrelor 90 milliGRAM(s) Oral every 12 hours      Vitals:  T(F): 97.8 (05-25), Max: 98 (05-24)  HR: 65 (05-25) (64 - 65)  BP: 111/68 (05-25) (111/68 - 130/78)  RR: 18 (05-25)  SpO2: 99% (05-25)  I&O's Summary      Physical Exam:  Appearance: No acute distress; well appearing  Eyes: PERRL, EOMI, pink conjunctiva  HEENT: Normal oral mucosa  Cardiovascular: RRR, S1, S2, no murmurs, rubs, or gallops; no edema; no JVD  Respiratory: Clear to auscultation bilaterally  Gastrointestinal: soft, non-tender, non-distended with normal bowel sounds  Musculoskeletal: No clubbing; no joint deformity   Neurologic: Non-focal  Lymphatic: No lymphadenopathy  Psychiatry: AAOx3, mood & affect appropriate  Skin: No rashes, ecchymoses, or cyanosis                          14.2   7.17  )-----------( 199      ( 25 May 2023 05:52 )             43.1     05-25    138  |  106  |  12  ----------------------------<  107<H>  3.6   |  22  |  0.91    Ca    8.6      25 May 2023 05:52  Phos  2.4     05-25  Mg     2.20     05-25    TPro  7.3  /  Alb  4.6  /  TBili  0.3  /  DBili  x   /  AST  22  /  ALT  13  /  AlkPhos  48  05-23    PT/INR - ( 23 May 2023 23:43 )   PT: 12.7 sec;   INR: 1.09 ratio         PTT - ( 24 May 2023 05:55 )  PTT:40.0 sec  CARDIAC MARKERS ( 24 May 2023 05:45 )  374 ng/L / x     / x     / x     / x     / x      CARDIAC MARKERS ( 24 May 2023 02:15 )  252 ng/L / x     / x     / 402 U/L / x     / 51.7 ng/mL  CARDIAC MARKERS ( 23 May 2023 22:00 )  87 ng/L / x     / x     / x     / x     / x                  New ECG(s): Personally reviewed    Echo:    Stress Testing:     Cath: 5/24 LHC w/ 90% LAD Stenosis s/p GRAHAM    Imaging:    Interpretation of Telemetry:

## 2023-05-25 NOTE — PHARMACOTHERAPY INTERVENTION NOTE - COMMENTS
Discharge medications reviewed with the patient. Outpatient medication schedule was discussed in detail including: medication name, indication, dose, administration times, treatment duration, side effects, drug interactions, and special instructions. Patient questions and concerns were answered and addressed. Patient demonstrated understanding. Informed patient that medication list may change prior to discharge. Patient provided with educational handout.    Lion Shen PharmD  Clinical Pharmacy Specialist  [phone]

## 2023-05-25 NOTE — DISCHARGE NOTE NURSING/CASE MANAGEMENT/SOCIAL WORK - PATIENT PORTAL LINK FT
You can access the FollowMyHealth Patient Portal offered by [facility] by registering at the following website: [email or web address]. By joining [employer]’s FollowMyHealth portal, you will also be able to view your health information using other applications (apps) compatible with our system.

## 2023-05-25 NOTE — DISCHARGE NOTE NURSING/CASE MANAGEMENT/SOCIAL WORK - NSDCPEFALRISK_GEN_ALL_CORE
For information on Fall & Injury Prevention, visit: [email or web address] OR  [email or web address] OR  https://www.cdc.gov/steadi/patient.html

## 2023-05-25 NOTE — DISCHARGE NOTE PROVIDER - CARE PROVIDER_API CALL
Keo Hernadez Eden  Internal Medicine  [address] [place] [address]  Phone: [phone]  Fax: [phone]  Follow Up Time: 1 week    Cardiology follow up 1 week,   Phone: (   )    -  Fax: (   )    -  Follow Up Time:

## 2023-05-25 NOTE — DISCHARGE NOTE PROVIDER - PROVIDER TOKENS
PROVIDER:[TOKEN:[[id number]],FOLLOWUP:[1 week]],FREE:[LAST:[Cardiology follow up 1 week],PHONE:[(   )    -],FAX:[(   )    -]]

## 2023-05-25 NOTE — DISCHARGE NOTE PROVIDER - HOSPITAL COURSE
68 yo m with h/o HLD on statin but reports suboptimal compliance  presenting with left sided chest pain radiating across his chest which spontaneously resolved within 2 hours. Denies cough, fever, leg swelling. Reports decreased exercise tolerance in last 2 weeks. Denies prior cath but underwent negative stress test 2 years ago. EKG nonischemic Trop 87 .  Patient underwent cardiac catherization while in the hospital with a stent placement at the LAD. Started on Bralinta.    On 5/25/2023 this case was reviewed with Dr. Bailey, the patient is medically stable and optimized for discharge. All medications were reviewed and prescriptions were sent to mutually agreed upon pharmacy.

## 2023-05-25 NOTE — DISCHARGE NOTE PROVIDER - NSDCCPCAREPLAN_GEN_ALL_CORE_FT
PRINCIPAL DISCHARGE DIAGNOSIS  Diagnosis: Chest pain  Assessment and Plan of Treatment: You were admitted to the hospital for chest pain.  You underwent a cardiac catherization. You had a stent placed.  Please continue your statin medication to control cholesterol levels, as well as, Aspirin ,Bralinta , and coreg as prescribed in order to optimize your heart health.   Follow-up with your primary provider/cardiologist as outpatient for ongoing care. If you have persistent chest pain, shortness of breath, heart palpitations, or dizziness you should return to the emergency room.

## 2023-05-25 NOTE — PROGRESS NOTE ADULT - ASSESSMENT
66 yo M hx HLD, presenting with complaints of chest pain that started at 5 pm upon waking up with associated nausea and sob.     # NSTEMI  -Patient presented w/ chest pain, elevated troponin, ECG nonischemic. Loaded w/ aspirin, brilinta, started on heparin. ACE held. Underwent LHC on 5/24, LAD 90% stenosis, w/ GRAHAM to LAD. Heparin stopped post-cath, trialed on Carvedilol 3.125 BID w/ HRs 50-70's, tolerating.   -Currently on ASA/Brilinta, Atorvastatin, Carvedilol 3.125 BID.   -a1c 5.6,  on last set of labs 5/24  -Echo completed 5/25 AM    Recommendations  -Continue ASA/Brilinta, Atorvastatin, Carvedilol at 3.125.   -Uptitrate ACEi as BP tolerates  -Please ensure w/ pharmacy that pt able to afford Brilinta before discharge  -Follow-up TTE results  -No need to continue trending trops post-cath  -Cardiology follow-up on outpatient   66 yo M hx HLD, presenting with complaints of chest pain that started at 5 pm upon waking up with associated nausea and sob.     # NSTEMI  -Patient presented w/ chest pain, elevated troponin, ECG nonischemic. Loaded w/ aspirin, brilinta, started on heparin. Held off on ACE pending procedure. Underwent LHC on 5/24, LAD 90% stenosis, w/ GRAHAM to LAD. Heparin stopped post-cath, trialed on Carvedilol 3.125 BID w/ HRs 50-70's, tolerating.   -Currently on ASA/Brilinta, Atorvastatin, Carvedilol 3.125 BID.   -a1c 5.6,  on last set of labs 5/24  -Echo completed 5/25 AM    Recommendations  -Continue ASA/Brilinta, Atorvastatin, Carvedilol at 3.125.   -Uptitrate ACEi as BP tolerates  -Please ensure w/ pharmacy that pt able to afford Brilinta before discharge  -Follow-up TTE results  -No need to continue trending trops post-cath  -Cardiology follow-up on outpatient   66 yo M hx HLD, presenting with complaints of chest pain that started at 5 pm upon waking up with associated nausea and sob.     # NSTEMI  -Patient presented w/ chest pain, elevated troponin, ECG nonischemic. Loaded w/ aspirin, brilinta, started on heparin. Held off on ACE pending procedure. Underwent LHC on 5/24, LAD 90% stenosis, w/ GRAHAM to LAD. Heparin stopped post-cath, trialed on Carvedilol 3.125 BID w/ HRs 50-70's, tolerating.   -Currently on ASA/Brilinta, Atorvastatin, Carvedilol 3.125 BID.   -a1c 5.6,  on last set of labs 5/24  -Echo completed 5/25 AM    Recommendations  -Continue ASA/Brilinta, Atorvastatin, Carvedilol at 3.125.   -Uptitrate ACEi as BP tolerates  -Please ensure w/ pharmacy that pt able to afford Brilinta before discharge  -Follow-up TTE results  -No need to continue trending trops post-cath  -Cardiology follow-up on outpatient    This patient was seen and examined personally by me and the plan was discussed with the fellow and/or resident above. Amendments were made as necessary to the above. Agree with the excellent note and plan above. Med mgmt as above.    Jr Squires MD, MPhil, [facility]  Cardiologist, [facility]  ; [facility]  Email: [email or web address] Cardiology and Cardiovascular Surgery on-service contact/call information, go to [email or web address] and use "[email or web address]" to login.  Outpatient Cardiology appointments, call [phone] to arrange with a colleague; I do not have outpatient Cardiology clinic.

## 2024-02-17 ENCOUNTER — EMERGENCY (EMERGENCY)
Facility: HOSPITAL | Age: 69
LOS: 1 days | Discharge: HOME | End: 2024-02-17
Attending: STUDENT IN AN ORGANIZED HEALTH CARE EDUCATION/TRAINING PROGRAM | Admitting: EMERGENCY MEDICINE
Payer: COMMERCIAL

## 2024-02-17 VITALS
SYSTOLIC BLOOD PRESSURE: 138 MMHG | OXYGEN SATURATION: 98 % | TEMPERATURE: 98 F | DIASTOLIC BLOOD PRESSURE: 64 MMHG | RESPIRATION RATE: 18 BRPM | HEART RATE: 75 BPM

## 2024-02-17 VITALS
TEMPERATURE: 98 F | OXYGEN SATURATION: 98 % | SYSTOLIC BLOOD PRESSURE: 159 MMHG | HEART RATE: 74 BPM | DIASTOLIC BLOOD PRESSURE: 104 MMHG | RESPIRATION RATE: 20 BRPM

## 2024-02-17 PROBLEM — E78.5 HYPERLIPIDEMIA, UNSPECIFIED: Chronic | Status: ACTIVE | Noted: 2023-05-23

## 2024-02-17 PROCEDURE — 93010 ELECTROCARDIOGRAM REPORT: CPT

## 2024-02-17 PROCEDURE — 71250 CT THORAX DX C-: CPT | Mod: 26,MA

## 2024-02-17 PROCEDURE — 70450 CT HEAD/BRAIN W/O DYE: CPT | Mod: 26,MA

## 2024-02-17 PROCEDURE — 99285 EMERGENCY DEPT VISIT HI MDM: CPT

## 2024-02-17 PROCEDURE — 72125 CT NECK SPINE W/O DYE: CPT | Mod: 26,MA

## 2024-02-17 RX ORDER — LIDOCAINE 4 G/100G
2 CREAM TOPICAL ONCE
Refills: 0 | Status: COMPLETED | OUTPATIENT
Start: 2024-02-17 | End: 2024-02-17

## 2024-02-17 RX ORDER — KETOROLAC TROMETHAMINE 30 MG/ML
30 SYRINGE (ML) INJECTION ONCE
Refills: 0 | Status: DISCONTINUED | OUTPATIENT
Start: 2024-02-17 | End: 2024-02-17

## 2024-02-17 NOTE — ED PROVIDER NOTE - PATIENT PORTAL LINK FT
You can access the FollowMyHealth Patient Portal offered by [facility] by registering at the following website: [email or web address]. By joining [employer]’s FollowMyHealth portal, you will also be able to view your health information using other applications (apps) compatible with our system.

## 2024-02-17 NOTE — ED PROVIDER NOTE - ATTENDING CONTRIBUTION TO CARE
I have personally performed a face to face medical and diagnostic evaluation of the patient. I have discussed with and reviewed the Resident's note and agree with the History, ROS, Physical Exam and MDM unless otherwise indicated. A brief summary of my personal evaluation and impression can be found below.    Jose Juan MCCARTHY: 68-year-old male history of CAD with stents on Brilinta hypertension hyperlipidemia, presents with a chief complaint of left-sided chest pain after a fall sustained 1 day ago, patient reports he was walking [place] the stairs [place] [employer], fell striking the left side of his chest, no loss of conscious but he did strike the back of his head has been ambulatory since however woke up today and had increasing pain with no nausea vomiting fever no shortness of breath no dyspnea no reports he is having difficulty getting out of bed secondary to his discomfort. Denies changes in vision. Denies numbness, tingling or loss of sensation. Denies loss of motor function. No bowel or bladder retention or incontinence. No midline back pain.     All other ROS negative, except as above and as per HPI and ROS section.    VITALS: Initial triage and subsequent vitals have been reviewed by me.  GEN APPEARANCE: Alert, non-toxic, well-appearing, NAD.  HEAD: Atraumatic.  EYES: PERRLa, EOMI, vision grossly intact.   NECK: Supple  CV: RRR, S1S2, no c/r/m/g. Cap refill <2 seconds. No bruits.   LUNGS: CTAB. No abnormal breath sounds.  ABDOMEN: Soft, NTND. No guarding or rebound.   MSK/EXT: mild diffuse L chest wall tenderness, No spinal or extremity point tenderness. No CVA ttp. Pelvis stable. No peripheral edema.  NEURO: Alert, follows commands. Weight bearing normal. Speech normal. Sensation and motor normal x4 extremities.   SKIN: abrasion to back of scalp, Warm, dry and intact. No rash.  PSYCH: Appropriate    Plan/MDM: exam vss non toxic PE as above ddx c/f msk sprain / strain vs possible rib fractures, presentation does not appear c/w that of acs, no c spine tenderness, no abdominal tenderness therefore have low suspicion for intraabdominal surgical pathology, will get screening ekg, check cth/ ct chest, will give meds as needed, reassess, dispo pending.

## 2024-02-17 NOTE — ED ADULT NURSE NOTE - OBJECTIVE STATEMENT
Received patient in room 10 s/p fall w/head injury and abrasion to back of the head. Patient denies SOB, chest pain, fever, dizziness. Patient is A&OX4, airway patent, breathing unlabored and even, radial pulses palpable, lungs clear, abdomen soft, nontender. Medications given as ordered, awaiting CT scan. Side rails up and safety maintained. Fall precaution in place. Call bells within reach. Received patient in room 10 s/p fall w/head injury and abrasion to back of the head. Patient reports left sided chest pain. Patient denies SOB, fever. Patient is A&OX4, airway patent, breathing unlabored and even, radial pulses palpable, lungs clear, abdomen soft, nontender. Awaiting CT scan. Side rails up and safety maintained. Fall precaution in place. Call bells within reach.

## 2024-02-17 NOTE — ED PROVIDER NOTE - PHYSICAL EXAMINATION
Constitutional: VS reviewed. Alert and orientedx3, well appearing, no apparent distress  HEENT: Dentition intact, EOMI, PERRL, EOMI, + 2 cm abrasion on posterior aspect of head   CV: RRR, + left sided chest wall TTP   Lungs: Clear and equal bilaterally, no wheezes, rales or crackles  Abdomen: Soft, nondistended, nontender  MSK: No deformities. No midline spinal TTP. + left lower anterior and posterior chest wall TTP w/o crepitus. Pelvis stable. Extremities warm and well perfused. 2+ distal pulses in all extremities.   Skin: Warm and dry. 1 cm area of bruising on anterior left wall. 2 cm abrasion on posterior aspect of head   Neuro: Strength and sensation intact. No pronator drift. No facial droop or slurred speech.   Lymph: No pitting edema in extremities.

## 2024-02-17 NOTE — ED PROVIDER NOTE - NSFOLLOWUPINSTRUCTIONS_ED_ALL_ED_FT
Rib Fracture       A rib fracture is a break or crack in one of the bones of the ribs. The ribs are like a cage that goes around your upper chest. A broken or cracked rib is often painful, but most do not cause other problems. Most rib fractures usually heal on their own in 1–3 months.    Follow these instructions at home:      Managing pain, stiffness, and swelling    If directed, apply ice to the injured area.    Put ice in a plastic bag.  Place a towel between your skin and the bag.  Leave the ice on for 20 minutes, 2–3 times a day.  Take over-the-counter and prescription medicines only as told by your doctor.        Activity    Avoid activities that cause pain to the injured area. Protect your injured area.  Slowly increase activity as told by your doctor.        General instructions    Do deep breathing as told by your doctor. You may be told to:    Take deep breaths many times a day.  Cough many times a day while hugging a pillow.  Use a device (incentive spirometer) to do deep breathing many times a day.  Drink enough fluid to keep your pee (urine) clear or pale yellow.  Do not wear a rib belt or binder. These do not allow you to breathe deeply.  Keep all follow-up visits as told by your doctor. This is important.    Contact a doctor if:  You have a fever.    Get help right away if:  You have trouble breathing.  You are short of breath.  You cannot stop coughing.  You cough up thick or bloody spit (sputum).  You feel sick to your stomach (nauseous), throw up (vomit), or have belly (abdominal) pain.  Your pain gets worse and medicine does not help.    To control your pain at home, you should take Ibuprofen 400 mg along with Tylenol 650mg-1000mg every 6 to 8 hours. Limit your maximum daily Tylenol from all sources to 4000mg. Be aware that many other medications contain acetaminophen which is also known as Tylenol. Taking Tylenol and Ibuprofen together has been shown to be more effective at relieving pain than taking them separately. These are both over the counter medications that you can  at your local pharmacy without a prescription. You need to respect all of the warnings on the bottles. You shouldn’t take these medications for more than a week without following up with your doctor. Both medications come with certain risks and side effects that you need to discuss with your doctor, especially if you are taking them for a prolonged period.     Summary  A rib fracture is a break or crack in one of the bones of the ribs.  Apply ice to the injured area and take medicines for pain as told by your doctor.  Take deep breaths and cough many times a day. Hug a pillow every time you cough.    ADDITIONAL NOTES AND INSTRUCTIONS    Please follow up with your Primary MD in 24-48 hr.  Seek immediate medical care for any new/worsening signs or symptoms.

## 2024-02-17 NOTE — ED PROVIDER NOTE - CARE PROVIDER_API CALL
Keo Hernadez [facility]  Internal Medicine  [address] [place] [id number]  Phone: [phone]  Fax: [phone]  Established Patient  Follow Up Time: 4-6 Days

## 2024-02-17 NOTE — ED PROVIDER NOTE - ADDITIONAL NOTES AND INSTRUCTIONS:
97.1 Please excuse Mr. Cohen from work until the above date or as return is tolerated.     David Melgar MD

## 2024-02-17 NOTE — ED PROVIDER NOTE - CLINICAL SUMMARY MEDICAL DECISION MAKING FREE TEXT BOX
68-year-old male with past medical history of , suspicion HTN, HLD, CAD s/p stents on Brilinta and ASA presents emergency department for left sided chest pain after mechanical fall down 6 steps 1 day ago. Pt endorses + head strike but no LOC. + TTP of left sided anterior and posterior chest wall without crepitus. No focal neuro deficits. + abrasion of posterior aspect of head. Differentials include but not limited to MSK spasm or strain, rib fractures, ICH. Plan for CTH, CT chest. Will give analgesia and reassess.

## 2024-02-17 NOTE — ED PROVIDER NOTE - OBJECTIVE STATEMENT
68-year-old male with past medical history of , suspicion HTN, HLD, CAD s/p stents on Brilinta and ASA presents emergency department for left sided chest pain after fall 1 day ago.  [place] falling down 6 stairs.  Patient endorses hitting left side of chest and back of head.  Patient denies any LOC.  Patient was initially able to go home but woke up this morning with increased left-sided chest pain, worse with deep inspiration.  Patient has not taken any analgesia.  Denies headaches, vision changes, neck pain, back pain, abdominal pain, nausea/vomiting, diarrhea/constipation, dysuria, hematuria.  Patient denies numbness or tingling in extremities or weakness of extremities.

## 2024-02-17 NOTE — ED ADULT NURSE NOTE - NSFALLHARMRISKINTERV_ED_ALL_ED

## 2024-02-17 NOTE — ED ADULT TRIAGE NOTE - CHIEF COMPLAINT QUOTE
S/[place] for left side ribs area pain, denies dizziness, on Brilinta for cardiac stents x 2, injured back of head a small abrasion noted with some red area noted.   pt denies dizziness, no blur vision.

## 2024-02-17 NOTE — ED PROVIDER NOTE - PROGRESS NOTE DETAILS
Nica Otoole PGY2: Pt reassessed and resting comfortably. Pt endorsing improvement in overall pain but still endorses left chest wall pain with movement. CTH and cervical spine non-actionable. Pending CT chest read. David Melgar MD (PGY3): CT w/ Left 6th rib fx lateral and posterior. discussed return precautions and pain control. also discussed IS use. David Melgar MD (PGY3): Road test passed. discussed with patient pain control with oxycodone, he states he would not like oxycodone, he will do tylenol and motrin.

## 2025-02-14 NOTE — ED ADULT NURSE NOTE - HOW OFTEN DO YOU HAVE A DRINK CONTAINING ALCOHOL?
Patient advised to get a influenza vaccine and Tdap booster; benefits, side effects and risks discussed with patient.   Never